# Patient Record
Sex: MALE | Race: WHITE | Employment: OTHER | URBAN - METROPOLITAN AREA
[De-identification: names, ages, dates, MRNs, and addresses within clinical notes are randomized per-mention and may not be internally consistent; named-entity substitution may affect disease eponyms.]

---

## 2022-11-17 RX ORDER — ASPIRIN 81 MG/1
81 TABLET ORAL DAILY
COMMUNITY

## 2022-11-17 RX ORDER — ESCITALOPRAM OXALATE 10 MG/1
10 TABLET ORAL NIGHTLY
COMMUNITY
Start: 2022-11-09 | End: 2023-02-07

## 2022-11-17 RX ORDER — IRBESARTAN 300 MG/1
300 TABLET ORAL NIGHTLY
COMMUNITY
Start: 2022-10-17 | End: 2023-04-15

## 2022-11-17 RX ORDER — SIMVASTATIN 20 MG
20 TABLET ORAL NIGHTLY
COMMUNITY
Start: 2022-06-28

## 2022-11-17 NOTE — PERIOP NOTE
Patient name, , and procedure verified. Type: 1a; abbreviated assessment per anesthesia guidelines    Labs per anesthesia: none    Instructed will receive notificattion the day before procedure by the GI Lab of time of arrival for  [address] cases, and by Pre-op Department for [address] cases. Arrival times should be called by 5 pm. If no phone is received the patient should contact their respective hospital. The GI lab telephone number is [phone] and ES Pre-op is [phone]. Follow diet and prep instructions per office including NPO status. If patient has NOT received instructions from office patient is advised to call surgeon office, verbalizes understanding. Bath or shower the night before and the am of surgery with non-mositurizing soap. No lotions, oils, powders, cologne on skin. No make up, eye make up or jewelry. Wear loose fitting comfortable, clean clothing. Must have adult present in building the entire time . TAKE ONLY THE FOLLOWING MEDICATION ON THE DAY OF THE PROCEDURE : none, however take all Rx meds the day before as regularly scheduled    MEDICATIONS TO HOLD : aspirin and multivitamin    The following discharge instructions reviewed : medication given during procedure may cause drowsiness for several hours, therefore, patient must not drive or operate machinery for remainder of the day. Patient may not drink alcohol on the day of your procedure, and should resume regular diet and activity unless otherwise directed. You may experience abdominal distention for several hours that is relieved by the passage of gas. Contact your physician if you have any of the following: fever or chills, severe abdominal pain or excessive amount of bleeding or a large amount when having a bowel movement. Occasional specks of blood with bowel movement would not be unusual.      You may be required to pay a deductible or co-pay on the day of your procedure.  You can pre-pay by calling [phone] if your surgery is at the [facility] or [phone] if your surgery is at the [facility].

## 2022-11-18 NOTE — PROGRESS NOTES
RN called Pt to confirm appointment time of 26, arrival time [phone], location,  requirement, and instructions for registration at the hospital.  Pt verbalized understanding.

## 2022-11-21 ENCOUNTER — ANESTHESIA (OUTPATIENT)
Dept: ENDOSCOPY | Age: 57
End: 2022-11-21
Payer: COMMERCIAL

## 2022-11-21 ENCOUNTER — ANESTHESIA EVENT (OUTPATIENT)
Dept: ENDOSCOPY | Age: 57
End: 2022-11-21
Payer: COMMERCIAL

## 2022-11-21 ENCOUNTER — HOSPITAL ENCOUNTER (OUTPATIENT)
Age: 57
Setting detail: OUTPATIENT SURGERY
Discharge: HOME | End: 2022-11-21
Attending: INTERNAL MEDICINE | Admitting: INTERNAL MEDICINE
Payer: COMMERCIAL

## 2022-11-21 VITALS
BODY MASS INDEX: 29.62 KG/M2 | DIASTOLIC BLOOD PRESSURE: 82 MMHG | TEMPERATURE: 97.4 F | HEIGHT: 69 IN | SYSTOLIC BLOOD PRESSURE: 139 MMHG | HEART RATE: 95 BPM | WEIGHT: 200 LBS | OXYGEN SATURATION: 94 % | RESPIRATION RATE: 18 BRPM

## 2022-11-21 PROCEDURE — 88305 TISSUE EXAM BY PATHOLOGIST: CPT

## 2022-11-21 PROCEDURE — 2709999900 HC NON-CHARGEABLE SUPPLY: Performed by: INTERNAL MEDICINE

## 2022-11-21 PROCEDURE — 2580000003 HC RX 258: Performed by: ANESTHESIOLOGY

## 2022-11-21 PROCEDURE — 6360000002 HC RX W HCPCS

## 2022-11-21 PROCEDURE — 3700000000 HC ANESTHESIA ATTENDED CARE: Performed by: INTERNAL MEDICINE

## 2022-11-21 PROCEDURE — 3609010600 HC COLONOSCOPY POLYPECTOMY SNARE/COLD BIOPSY: Performed by: INTERNAL MEDICINE

## 2022-11-21 PROCEDURE — 3609012400 HC EGD TRANSORAL BIOPSY SINGLE/MULTIPLE: Performed by: INTERNAL MEDICINE

## 2022-11-21 PROCEDURE — 7100000011 HC PHASE II RECOVERY - ADDTL 15 MIN: Performed by: INTERNAL MEDICINE

## 2022-11-21 PROCEDURE — 2580000003 HC RX 258

## 2022-11-21 PROCEDURE — 3700000001 HC ADD 15 MINUTES (ANESTHESIA): Performed by: INTERNAL MEDICINE

## 2022-11-21 PROCEDURE — 88312 SPECIAL STAINS GROUP 1: CPT

## 2022-11-21 PROCEDURE — 7100000010 HC PHASE II RECOVERY - FIRST 15 MIN: Performed by: INTERNAL MEDICINE

## 2022-11-21 PROCEDURE — 2500000003 HC RX 250 WO HCPCS

## 2022-11-21 RX ORDER — PROPOFOL 10 MG/ML
INJECTION, EMULSION INTRAVENOUS CONTINUOUS PRN
Status: DISCONTINUED | OUTPATIENT
Start: 2022-11-21 | End: 2022-11-21 | Stop reason: SDUPTHER

## 2022-11-21 RX ORDER — SODIUM CHLORIDE 0.9 % (FLUSH) 0.9 %
5-40 SYRINGE (ML) INJECTION EVERY 12 HOURS SCHEDULED
Status: CANCELLED | OUTPATIENT
Start: 2022-11-21

## 2022-11-21 RX ORDER — SODIUM CHLORIDE, SODIUM LACTATE, POTASSIUM CHLORIDE, CALCIUM CHLORIDE 600; 310; 30; 20 MG/100ML; MG/100ML; MG/100ML; MG/100ML
INJECTION, SOLUTION INTRAVENOUS CONTINUOUS PRN
Status: DISCONTINUED | OUTPATIENT
Start: 2022-11-21 | End: 2022-11-21 | Stop reason: SDUPTHER

## 2022-11-21 RX ORDER — SODIUM CHLORIDE, SODIUM LACTATE, POTASSIUM CHLORIDE, CALCIUM CHLORIDE 600; 310; 30; 20 MG/100ML; MG/100ML; MG/100ML; MG/100ML
INJECTION, SOLUTION INTRAVENOUS CONTINUOUS
Status: DISCONTINUED | OUTPATIENT
Start: 2022-11-21 | End: 2022-11-21 | Stop reason: HOSPADM

## 2022-11-21 RX ORDER — ONDANSETRON 2 MG/ML
INJECTION INTRAMUSCULAR; INTRAVENOUS PRN
Status: DISCONTINUED | OUTPATIENT
Start: 2022-11-21 | End: 2022-11-21 | Stop reason: SDUPTHER

## 2022-11-21 RX ORDER — SODIUM CHLORIDE 9 MG/ML
INJECTION, SOLUTION INTRAVENOUS PRN
Status: CANCELLED | OUTPATIENT
Start: 2022-11-21

## 2022-11-21 RX ORDER — LIDOCAINE HYDROCHLORIDE 20 MG/ML
INJECTION, SOLUTION EPIDURAL; INFILTRATION; INTRACAUDAL; PERINEURAL PRN
Status: DISCONTINUED | OUTPATIENT
Start: 2022-11-21 | End: 2022-11-21 | Stop reason: SDUPTHER

## 2022-11-21 RX ORDER — PROPOFOL 10 MG/ML
INJECTION, EMULSION INTRAVENOUS PRN
Status: DISCONTINUED | OUTPATIENT
Start: 2022-11-21 | End: 2022-11-21 | Stop reason: SDUPTHER

## 2022-11-21 RX ORDER — GLYCOPYRROLATE 0.2 MG/ML
INJECTION INTRAMUSCULAR; INTRAVENOUS PRN
Status: DISCONTINUED | OUTPATIENT
Start: 2022-11-21 | End: 2022-11-21 | Stop reason: SDUPTHER

## 2022-11-21 RX ORDER — ONDANSETRON 2 MG/ML
4 INJECTION INTRAMUSCULAR; INTRAVENOUS
Status: CANCELLED | OUTPATIENT
Start: 2022-11-21 | End: 2022-11-22

## 2022-11-21 RX ORDER — LIDOCAINE HYDROCHLORIDE 10 MG/ML
1 INJECTION, SOLUTION INFILTRATION; PERINEURAL
Status: DISCONTINUED | OUTPATIENT
Start: 2022-11-21 | End: 2022-11-21 | Stop reason: HOSPADM

## 2022-11-21 RX ORDER — SODIUM CHLORIDE 0.9 % (FLUSH) 0.9 %
5-40 SYRINGE (ML) INJECTION PRN
Status: CANCELLED | OUTPATIENT
Start: 2022-11-21

## 2022-11-21 RX ADMIN — SODIUM CHLORIDE, SODIUM LACTATE, POTASSIUM CHLORIDE, AND CALCIUM CHLORIDE: 600; 310; 30; 20 INJECTION, SOLUTION INTRAVENOUS at 12:15

## 2022-11-21 RX ADMIN — ONDANSETRON 4 MG: 2 INJECTION INTRAMUSCULAR; INTRAVENOUS at 12:17

## 2022-11-21 RX ADMIN — PROPOFOL 200 MCG/KG/MIN: 10 INJECTION, EMULSION INTRAVENOUS at 12:22

## 2022-11-21 RX ADMIN — GLYCOPYRROLATE 0.1 MG: 0.2 INJECTION, SOLUTION INTRAMUSCULAR; INTRAVENOUS at 12:17

## 2022-11-21 RX ADMIN — PROPOFOL 40 MG: 10 INJECTION, EMULSION INTRAVENOUS at 12:19

## 2022-11-21 RX ADMIN — PROPOFOL 20 MG: 10 INJECTION, EMULSION INTRAVENOUS at 12:20

## 2022-11-21 RX ADMIN — LIDOCAINE HYDROCHLORIDE 100 MG: 20 INJECTION, SOLUTION EPIDURAL; INFILTRATION; INTRACAUDAL; PERINEURAL at 12:18

## 2022-11-21 RX ADMIN — SODIUM CHLORIDE, POTASSIUM CHLORIDE, SODIUM LACTATE AND CALCIUM CHLORIDE: 600; 310; 30; 20 INJECTION, SOLUTION INTRAVENOUS at 12:13

## 2022-11-21 RX ADMIN — PROPOFOL 60 MG: 10 INJECTION, EMULSION INTRAVENOUS at 12:18

## 2022-11-21 RX ADMIN — PROPOFOL 20 MG: 10 INJECTION, EMULSION INTRAVENOUS at 12:21

## 2022-11-21 ASSESSMENT — PAIN - FUNCTIONAL ASSESSMENT: PAIN_FUNCTIONAL_ASSESSMENT: 0-10

## 2022-11-21 ASSESSMENT — PAIN DESCRIPTION - DESCRIPTORS: DESCRIPTORS: ACHING

## 2022-11-21 NOTE — ANESTHESIA POSTPROCEDURE EVALUATION
Department of Anesthesiology  Postprocedure Note    Patient: Gt Lee  MRN: [id number]  YOB: 1965  Date of evaluation: 11/21/2022      Procedure Summary     Date: 11/21/22 Room / Location: [facility] ENDO 05 / [facility] ENDOSCOPY    Anesthesia Start: 1215 Anesthesia Stop: 7832    Procedures:       EGD BIOPSY (Upper GI Region)      COLONOSCOPY POLYPECTOMY SNARE/COLD BIOPSY Diagnosis:       Special screening for malignant neoplasm of colon      (Special screening for malignant neoplasm of colon [Z12.11])    Surgeons: Anatoliy Hidalgo MD Responsible Provider: Jose De Jesus Wilson MD    Anesthesia Type: TIVA ASA Status: 2          Anesthesia Type: No value filed. Hernan Phase I: Hernan Score: 10    Hernan Phase II: Hernan Score: 9      Anesthesia Post Evaluation    Patient location during evaluation: PACU  Patient participation: complete - patient participated  Level of consciousness: awake and alert  Airway patency: patent  Nausea & Vomiting: no nausea and no vomiting  Complications: no  Cardiovascular status: hemodynamically stable  Respiratory status: acceptable  Hydration status: euvolemic  Comments: Blood pressure (!) 149/85, pulse 98, temperature 97.4 °F (36.3 °C), temperature source Temporal, resp. rate 16, height 5' 9\" (1.753 m), weight 200 lb (90.7 kg), SpO2 95 %.       Pt stable for discharge from PACU  Multimodal analgesia pain management approach

## 2022-11-21 NOTE — OP NOTE
PROCEDURE: Colonoscopy with snare polypectomy and biopsy    ENDOSCOPIST: Yaniv Bartlett M.D. PREOPERATIVE DIAGNOSIS: Screening, FH of CRC    POSTOPERATIVE DIAGNOSIS: Polyps, diverticulosis, external hemorrhoids    SEDATION: MAC    INSTRUMENT: [id number]    DESCRIPTION OF PROCEDURE:  After informed consent was obtained the patient was placed in the left lateral decubitus position. Intravenous sedation was administered as above. After adequate sedation had been achieved, digital rectal examination was performed. The colonoscope was then inserted through the anus and followed the course of the rectum and colon to the cecum, which was confirmed by visualization of the ileocecal valve and appendiceal orifice. The colonoscope was withdrawn from that point, performing a careful survey of the mucosa. Retroflexion was performed in the rectum. FINDINGS: Fair prep    Colon: Diminutive polyp removed from [facility] [place] with cold forceps. 4 mm pedunculated polyp removed from  with cold snare. Diverticulosis noted in sigmoid    Rectum: External hemorrhoids    Estimated Blood Loss:  0 cc-min    IMPRESSION: 2 small polyps. Technically challenging due to hypotonicity and redundancy      PLAN: Repeat pending path, requires 2-day prep option 2    D.  Talha Lanza MD

## 2022-11-21 NOTE — ANESTHESIA PRE PROCEDURE
Department of Anesthesiology  Preprocedure Note       Name:  Eldridge Kanner   Age:  61 y.o.  :  1965                                          MRN:  [id number]         Date:  2022      Surgeon: Mallory Cedeño):  Luis A Leach MD    Procedure: Procedure(s):  EGD ESOPHAGOGASTRODUODENOSCOPY  COLORECTAL CANCER SCREENING, NOT HIGH RISK/ 32    Medications prior to admission:   Prior to Admission medications    Medication Sig Start Date End Date Taking? Authorizing Provider   escitalopram (LEXAPRO) 10 MG tablet Take 10 mg by mouth at bedtime 22 Yes Historical Provider, MD   irbesartan (AVAPRO) 300 MG tablet Take 300 mg by mouth nightly 10/17/22 4/15/23 Yes Historical Provider, MD   simvastatin (ZOCOR) 20 MG tablet Take 20 mg by mouth nightly 22  Yes Historical Provider, MD   aspirin 81 MG EC tablet Take 81 mg by mouth daily   Yes Historical Provider, MD   Multiple Vitamins-Minerals (MULTIVITAMIN ADULTS PO) Take 1 tablet by mouth daily   Yes Historical Provider, MD       Current medications:    No current facility-administered medications for this encounter. Allergies:  No Known Allergies    Problem List:  There is no problem list on file for this patient.       Past Medical History:        Diagnosis Date    Anxiety     Benign essential HTN     Former smoker     quit since 2022- 1 ppd since teenager    History of kidney stones        Past Surgical History:        Procedure Laterality Date    CHOLECYSTECTOMY, LAPAROSCOPIC      CYSTOSCOPY W/ URETERAL STENT PLACEMENT      kidney stone    INGUINAL HERNIA REPAIR      childhood       Social History:    Social History     Tobacco Use    Smoking status: Former     Packs/day: 1.00     Types: Cigarettes     Start date: 0     Quit date:      Years since quittin.8    Smokeless tobacco: Never   Substance Use Topics    Alcohol use: Not Currently                                Counseling given: Not Answered      Vital Signs (Current):   Vitals:    11/17/22 0859 11/21/22 1149   BP:  (!) 157/82   Pulse:  100   Resp:  16   Temp:  98 °F (36.7 °C)   TempSrc:  Oral   SpO2:  97%   Weight: 200 lb (90.7 kg) 200 lb (90.7 kg)   Height: 5' 9\" (1.753 m) 5' 9\" (1.753 m)                                              BP Readings from Last 3 Encounters:   11/21/22 (!) 157/82       NPO Status: Time of last liquid consumption: 0900 (12oz.)                        Time of last solid consumption: 1500                        Date of last liquid consumption: 11/21/22                        Date of last solid food consumption: 11/18/22    BMI:   Wt Readings from Last 3 Encounters:   11/21/22 200 lb (90.7 kg)     Body mass index is 29.53 kg/m². CBC: No results found for: WBC, RBC, HGB, HCT, MCV, RDW, PLT    CMP: No results found for: NA, K, CL, CO2, BUN, CREATININE, GFRAA, AGRATIO, LABGLOM, GLUCOSE, GLU, PROT, CALCIUM, BILITOT, ALKPHOS, AST, ALT    POC Tests: No results for input(s): POCGLU, POCNA, POCK, POCCL, POCBUN, POCHEMO, POCHCT in the last 72 hours.     Coags: No results found for: PROTIME, INR, APTT    HCG (If Applicable): No results found for: PREGTESTUR, PREGSERUM, HCG, HCGQUANT     ABGs: No results found for: PHART, PO2ART, IOU1BPU, CKX9RXW, BEART, H2MSTPLI     Type & Screen (If Applicable):  No results found for: LABABO, LABRH    Drug/Infectious Status (If Applicable):  No results found for: HIV, HEPCAB    COVID-19 Screening (If Applicable): No results found for: COVID19        Anesthesia Evaluation  Patient summary reviewed and Nursing notes reviewed  Airway: Mallampati: III  TM distance: >3 FB   Neck ROM: full  Mouth opening: > = 3 FB   Dental: normal exam         Pulmonary:Negative Pulmonary ROS breath sounds clear to auscultation                             Cardiovascular:  Exercise tolerance: good (>4 METS),   (+) hypertension:, hyperlipidemia        Rhythm: regular  Rate: normal                 ROS comment: Echo 2021 - EF nml, no sig valve dz  Stress test 2021 - normal perfusion     Neuro/Psych:   Negative Neuro/Psych ROS              GI/Hepatic/Renal: Neg GI/Hepatic/Renal ROS            Endo/Other: Negative Endo/Other ROS                    Abdominal:             Vascular: negative vascular ROS. Other Findings:           Anesthesia Plan      TIVA     ASA 2       Induction: intravenous. Anesthetic plan and risks discussed with patient.                         Gill Jama MD   11/21/2022

## 2022-11-21 NOTE — OP NOTE
PROCEDURE: Esophagogastroduodenoscopy with biopsy    ENDOSCOPIST: Oswaldo Kent M.D. PREOPERATIVE DIAGNOSIS: Chest pain    POSTOPERATIVE DIAGNOSIS: Gastric ulcers, duodenal ulcers    INSTRUMENTS: [id number]    SEDATION: MAC    DESCRIPTION: After informed consent was obtained, the patient was taken to the endoscopy suite and placed in the left lateral decubitus position. Intravenous sedation was administered by the Anesthesia provider. After adequate sedation had been achieved the endoscope was inserted over the tongue and through the posterior pharynx under direct visualization down the esophagus to the stomach and into the second portion of the duodenum. The endoscopic was withdrawn from that point, performing a careful survey of the mucosa. Retroflexion was performed in the gastric fundus. FINDINGS:  Esophagus: Normal    Stomach: three small, clean-based ulcers of antrum. Biopsies obtained from ulcers    Duodenum: Innumerable small, clean-based ulcers of bulb and D2. Estimated blood loss:  0 cc-minimal    IMPRESSION: Extensive PUD    PLAN: Start omeprazole 40 mg daily. Avoid NSAIDs other than ASA 81 mg      D. Richard Bray MD

## 2022-11-21 NOTE — H&P
[facility] H&P (Admission)        Date:  2022      Chief Complaint:  Chest pain, high risk CRC screening    Subjective:     History of Present Illness:  Patient is a 61 y.o. male who is being admitted for EGD and colonoscopy. PMH:  Past Medical History:   Diagnosis Date    Anxiety     Benign essential HTN     Former smoker     quit since 2022- 1 ppd since teenager    History of kidney stones        PSH:  Past Surgical History:   Procedure Laterality Date    CHOLECYSTECTOMY, LAPAROSCOPIC      CYSTOSCOPY W/ URETERAL STENT PLACEMENT      kidney stone    INGUINAL HERNIA REPAIR      childhood       Allergies:  No Known Allergies    Home Medications:  Prior to Admission medications    Medication Sig Start Date End Date Taking? Authorizing Provider   escitalopram (LEXAPRO) 10 MG tablet Take 10 mg by mouth at bedtime 22 Yes Historical Provider, MD   irbesartan (AVAPRO) 300 MG tablet Take 300 mg by mouth nightly 10/17/22 4/15/23 Yes Historical Provider, MD   simvastatin (ZOCOR) 20 MG tablet Take 20 mg by mouth nightly 22  Yes Historical Provider, MD   aspirin 81 MG EC tablet Take 81 mg by mouth daily   Yes Historical Provider, MD   Multiple Vitamins-Minerals (MULTIVITAMIN ADULTS PO) Take 1 tablet by mouth daily   Yes Historical Provider, MD       Hospital Medications:  No current facility-administered medications for this encounter. Social History:  Social History     Tobacco Use    Smoking status: Former     Packs/day: 1.00     Types: Cigarettes     Start date:      Quit date:      Years since quittin.8    Smokeless tobacco: Never   Substance Use Topics    Alcohol use: Not Currently       Pt denies any history of drug use, tattoos, or blood transfusions. Family History:  History reviewed. No pertinent family history. Review of Systems:  A detailed 10 system ROS is obtained, with pertinent positives as listed above.   All others are negative. Objective:     Physical Exam:  Vitals:  Ht 5' 9\" (1.753 m)   Wt 200 lb (90.7 kg)   BMI 29.53 kg/m²   Gen:  Pt is alert, cooperative, no acute distress  Cardiovascular: Regular rate and rhythm. No murmurs, gallops, or rubs. Respiratory:  Comfortable breathing with no accessory muscle use. Clear breath sounds with no wheezes, rales, or rhonchi. GI:  Abdomen nondistended, soft, and nontender. Normal active bowel sounds. No enlargement of the liver or spleen. No masses palpable. Laboratory:    No results for input(s): WBC, HGB, HCT, PLT, MCV, NA, K, CL, CO2, BUN, CREATININE, CALCIUM, MG, PHOS, GLUCOSE, ALKPHOS, AST, ALT, BILITOT, BILIDIR, LABALBU, PROT, AMYLASE, LIPASE, INR, APTT in the last 72 hours. Invalid input(s): GPT, LACTICACID, PT    Assessment:       Active Problems:    * No active hospital problems. *  Resolved Problems:    * No resolved hospital problems.  *    Atypical chest pain; CRC screening with FH of CRC  Plan:       EGD and colonoscopy

## 2022-11-21 NOTE — PROGRESS NOTES
Pt discharged at this time with family, via wheelchair. Passing flatus. Tolerating po fluids. VSS. No acute distress noted.

## 2022-11-21 NOTE — PROGRESS NOTES
Drank 12 ounces of water at 0900  refuses to sit in the waiting room to wait his turn for procedure  states has \" slipped disc in back\"  brother will stay in waiting room and get patient where he is walking in the fisher when he is called.   Gait steady  no listed pain meds  in home med list

## 2022-11-21 NOTE — DISCHARGE INSTRUCTIONS
Gastrointestinal Esophagogastroduodenoscopy (EGD)/ Endoscopic Ultrasound(EUS)- Upper Exam Discharge Instructions    1. Call Dr. Randle Links at [phone]  for any problems or questions. 2. Contact the doctor's office for follow up appointment as directed. 3. Medication may cause drowsiness for several hours, therefore, do not drive or operate machinery for remainder of the day. 4. No alcohol today. 5. Do not make any important decisions such as signing legal paperwork. 6. Ordinarily, you may resume regular diet and activity after exam unless otherwise specified by your physician. 7. For mild soreness in your throat you may use Cepacol throat lozenges or warm  salt-water gargles as needed. Gastrointestinal Colonoscopy/Flexible Sigmoidoscopy - Lower Exam Discharge Instructions    Contact the doctors office for follow up appointment as directed  Medication may cause drowsiness for several hours, therefore, do not drive or operate machinery for remainder of the day. No alcohol today. Do not make any important decisions such signing legal paperwork. Ordinarily, you may resume regular diet and activity after exam unless otherwise specified by your physician. Because of air put into your colon during exam, you may experience some abdominal distension, relieved by the passage of gas, for several hours. Contact your physician if you have any of the following:  Excessive amount of bleeding - large amount when having a bowel movement.   Occasional specks of blood with bowel movement would not be unusual.  Severe abdominal pain  Fever or Chills      Any additional instructions:   -Start taking a high dose PPI, as prescribed  -Avoid NSAIDs  -Repeat colonoscopy with 2 day prep in the future

## (undated) DEVICE — SINGLE PORT MANIFOLD: Brand: NEPTUNE 2

## (undated) DEVICE — CONTAINER FORMALIN PREFILLED 10% NBF 60ML

## (undated) DEVICE — FORCEPS BX L240CM JAW DIA2.8MM L CAP W/ NDL MIC MESH TOOTH

## (undated) DEVICE — BLOCK BITE AD 60FR W/ VELC STRP ADDRESSES MOST PT AND

## (undated) DEVICE — SYRINGE MED 3ML CLR PLAS STD N CTRL LUERLOCK TIP DISP

## (undated) DEVICE — NEEDLE SYR 18GA L1.5IN RED PLAS HUB S STL BLNT FILL W/O

## (undated) DEVICE — GAUZE,SPONGE,4"X4",12PLY,WOVEN,NS,LF: Brand: MEDLINE

## (undated) DEVICE — CONNECTOR TBNG OD5-7MM O2 END DISP

## (undated) DEVICE — AIRLIFE™ OXYGEN TUBING 7 FEET (2.1 M) CRUSH RESISTANT OXYGEN TUBING, VINYL TIPPED: Brand: AIRLIFE™

## (undated) DEVICE — SNARE POLYP SM W13MMXL240CM SHTH DIA2.4MM OVL FLX DISP

## (undated) DEVICE — SYRINGE MED 10ML LUERLOCK TIP W/O SFTY DISP

## (undated) DEVICE — LUBE JELLY FOIL PACK 1.4 OZ: Brand: MEDLINE INDUSTRIES, INC.

## (undated) DEVICE — KENDALL RADIOLUCENT FOAM MONITORING ELECTRODE RECTANGULAR SHAPE: Brand: KENDALL

## (undated) DEVICE — TRAP SPEC POLYP REM STRNR CLN DSGN MAGNIFYING WIND DISP

## (undated) DEVICE — CANNULA NSL ORAL AD FOR CAPNOFLEX CO2 O2 AIRLFE